# Patient Record
Sex: FEMALE | Race: ASIAN | NOT HISPANIC OR LATINO | Employment: OTHER | URBAN - NONMETROPOLITAN AREA
[De-identification: names, ages, dates, MRNs, and addresses within clinical notes are randomized per-mention and may not be internally consistent; named-entity substitution may affect disease eponyms.]

---

## 2024-06-25 ENCOUNTER — OFFICE VISIT (OUTPATIENT)
Dept: OBSTETRICS AND GYNECOLOGY | Age: 54
End: 2024-06-25
Payer: OTHER GOVERNMENT

## 2024-06-25 VITALS
SYSTOLIC BLOOD PRESSURE: 132 MMHG | DIASTOLIC BLOOD PRESSURE: 80 MMHG | BODY MASS INDEX: 25.59 KG/M2 | WEIGHT: 153.6 LBS | HEIGHT: 65 IN

## 2024-06-25 DIAGNOSIS — Z11.3 SCREENING EXAMINATION FOR VENEREAL DISEASE: ICD-10-CM

## 2024-06-25 DIAGNOSIS — N92.0 MENSTRUAL SPOTTING: ICD-10-CM

## 2024-06-25 DIAGNOSIS — Z12.11 SCREENING FOR MALIGNANT NEOPLASM OF COLON: ICD-10-CM

## 2024-06-25 DIAGNOSIS — R14.0 ABDOMINAL BLOATING: ICD-10-CM

## 2024-06-25 DIAGNOSIS — Z12.4 SCREENING FOR MALIGNANT NEOPLASM OF CERVIX: ICD-10-CM

## 2024-06-25 DIAGNOSIS — R93.5 ABNORMAL ULTRASOUND OF ENDOMETRIUM: ICD-10-CM

## 2024-06-25 DIAGNOSIS — N97.9 INFERTILITY, FEMALE: ICD-10-CM

## 2024-06-25 DIAGNOSIS — Z32.00 POSSIBLE PREGNANCY, NOT CONFIRMED: ICD-10-CM

## 2024-06-25 DIAGNOSIS — D25.1 INTRAMURAL LEIOMYOMA OF UTERUS: ICD-10-CM

## 2024-06-25 DIAGNOSIS — Z12.31 SCREENING MAMMOGRAM FOR BREAST CANCER: ICD-10-CM

## 2024-06-25 DIAGNOSIS — N95.1 PERIMENOPAUSAL: Primary | ICD-10-CM

## 2024-06-25 LAB
B-HCG UR QL: NEGATIVE
EXPIRATION DATE: NORMAL
INTERNAL NEGATIVE CONTROL: NEGATIVE
INTERNAL POSITIVE CONTROL: POSITIVE
Lab: NORMAL

## 2024-06-25 PROCEDURE — 99204 OFFICE O/P NEW MOD 45 MIN: CPT | Performed by: OBSTETRICS & GYNECOLOGY

## 2024-06-25 PROCEDURE — 81025 URINE PREGNANCY TEST: CPT | Performed by: OBSTETRICS & GYNECOLOGY

## 2024-06-25 RX ORDER — DIPHENHYDRAMINE HCL 25 MG
25 CAPSULE ORAL EVERY 6 HOURS PRN
COMMUNITY

## 2024-06-25 RX ORDER — PRENATAL VIT NO.126/IRON/FOLIC 28MG-0.8MG
TABLET ORAL DAILY
COMMUNITY

## 2024-06-25 NOTE — PROGRESS NOTES
Lucas Ferreira MD  [facility]  [address]  [place] [address]  Office [phone]  Fax [phone]      [facility]  Davina Ayala  : 1970  MRN: [id number]    Subjective   Subjective     Chief Complaint   Patient presents with    Menstrual Problem     Patient here to discuss concerns around possible pregnancy or perimenopause. Was late for period, then had spotting for 5 days. The last 4 months, she has had spotting for 5 days. Fatigue, nausea, some strange food cravings. No hot flashes or night sweats. Negative pregnancy tests.    LMP started 24  Denies history of abnormal paps.       History of Present Illness  Davina Ayala is a 53 y.o. female , , who comes to the office today for consultation for infertility. Interested in future fertility. Has two prior miscarriages in her late 40s. Does note for the past 4 months spotting monthly. Not similar to a menses. Prior to this was normal menses. Spotting 5 days. LMP , with 7 days of bleeding. Having bloating and morning sickness. Did have a positive pregnancy test 3 months ago. Worried she is pregnant. Denies menopausal symptoms otherwise. .      Review of Systems   Genitourinary:  Positive for menstrual problem. Negative for decreased urine volume, difficulty urinating, dyspareunia, dysuria, enuresis, flank pain, frequency, genital sores, hematuria, pelvic pain, urgency, vaginal bleeding, vaginal discharge and vaginal pain.   All other systems reviewed and are negative.           Personal History     The following portions of the patient's history were reviewed and updated as appropriate: allergies, current medications, past family history, past medical history, past social history, past surgical history, and problem list.    History Review Reviewed Comments   Past Medical History:  [x]     Past Surgical History: [x]     Family History: [x]     Social History: [x]       Current Outpatient Medications  "  Medication Instructions    diphenhydrAMINE (BENADRYL) 25 mg, Oral, Every 6 Hours PRN    prenatal vitamin (prenatal, CLASSIC, vitamin) tablet Oral, Daily       Allergies   Allergen Reactions    Risperidone Shortness Of Breath    Bactrim [Sulfamethoxazole-Trimethoprim] Rash       Objective    Objective     Vitals:   Visit Vitals  /80   Ht 165.1 cm (65\")   Wt 69.7 kg (153 lb 9.6 oz)   LMP 06/13/2024   BMI 25.56 kg/m²        Physical Exam  Vitals reviewed.   Constitutional:       General: She is not in acute distress.     Appearance: Normal appearance. She is not ill-appearing.   HENT:      Head: Normocephalic and atraumatic.      Nose: No congestion or rhinorrhea.   Eyes:      General: No scleral icterus.        Right eye: No discharge.         Left eye: No discharge.      Extraocular Movements: Extraocular movements intact.      Conjunctiva/sclera: Conjunctivae normal.   Pulmonary:      Effort: Pulmonary effort is normal. No accessory muscle usage or respiratory distress.   Chest:      Comments: Patient deferred today    Genitourinary:     Comments: Patient deferred today  Musculoskeletal:      Right lower leg: No edema.      Left lower leg: No edema.   Skin:     General: Skin is warm and dry.      Coloration: Skin is not ashen, cyanotic or jaundiced.   Neurological:      General: No focal deficit present.      Mental Status: She is alert and oriented to person, place, and time.   Psychiatric:         Mood and Affect: Mood normal.         Behavior: Behavior is cooperative.       Result Review    POC Pregnancy, Urine (06/25/2024 16:02) negative    US Non-ob Transvaginal (06/25/2024 15:58)   1.  Uterus: Enlarged, with uterine dimensions 8.8 x 5.7 x 5.3 cm, and Anteverted     2.  Endometrium:  thickened, 1.1 cm; isoechoic area within the endometrium possibly a polyp      3.  Myometrium: anterior leiomyoma measures 2.1 x 2.2 cm     4.  Ovaries  Left:    Normal/unremarkable   Right:  Normal/unremarkable     "     Assessment & Plan   Assessment / Plan     Diagnoses and all orders for this visit:    1. Perimenopausal (Primary)  -     Antimullerian Hormone (AMH)  -     Follicle Stimulating Hormone    2. Possible pregnancy, not confirmed  -     Cancel: HCG, B-subunit, Quantitative ([facility] Only)  -     POC Pregnancy, Urine    3. Infertility, female    4. Screening mammogram for breast cancer  -     Mammo screening digital tomosynthesis bilateral w CAD; Future    5. Screening for malignant neoplasm of colon  -     Ambulatory Referral For Screening Colonoscopy    6. Menstrual spotting    7. Screening examination for venereal disease    8. Screening for malignant neoplasm of cervix    9. Abnormal ultrasound of endometrium    10. Intramural leiomyoma of uterus    11. Abdominal bloating        Discussion:   Recommended updating routine annual healthcare including colonoscopy/mammogram/pap smear. Pregnancy test today is negative. Not pregnant. Advised checking FSH/AMH levels for options for fertility. Discussed if she desires fertility, she will need to see [facility] for possible IVF. Discussed risks of AMA and pregnancy at this age including the increases risks of morbidity/mortality. Discussed that with her spotting, this is abnormal and with her ultrasound findings, further evaluation is warranted. She is agreeable to this but wants to return another date. Return for pap smear/EMB. Questions answered. She expressed understanding.     Follow-up: Return in about 2 weeks (around 7/9/2024), or if symptoms worsen or fail to improve, for GYN f/u/emb/pap.    Lucas Ferreira MD

## 2024-06-28 LAB
FSH SERPL-ACNC: 4.6 MIU/ML
MIS SERPL-MCNC: 0.73 NG/ML

## 2024-07-08 LAB
NCCN CRITERIA FLAG: NORMAL
TYRER CUZICK SCORE: 10.1

## 2024-07-11 ENCOUNTER — OFFICE VISIT (OUTPATIENT)
Dept: OBSTETRICS AND GYNECOLOGY | Age: 54
End: 2024-07-11
Payer: OTHER GOVERNMENT

## 2024-07-11 ENCOUNTER — HOSPITAL ENCOUNTER (OUTPATIENT)
Dept: MAMMOGRAPHY | Facility: HOSPITAL | Age: 54
Discharge: HOME | End: 2024-07-11
Admitting: OBSTETRICS & GYNECOLOGY
Payer: OTHER GOVERNMENT

## 2024-07-11 VITALS
WEIGHT: 150.4 LBS | HEIGHT: 65 IN | SYSTOLIC BLOOD PRESSURE: 128 MMHG | BODY MASS INDEX: 25.06 KG/M2 | DIASTOLIC BLOOD PRESSURE: 98 MMHG

## 2024-07-11 DIAGNOSIS — N92.6 IRREGULAR MENSES: ICD-10-CM

## 2024-07-11 DIAGNOSIS — R93.5 ABNORMAL ULTRASOUND OF ENDOMETRIUM: Primary | ICD-10-CM

## 2024-07-11 DIAGNOSIS — Z12.31 SCREENING MAMMOGRAM FOR BREAST CANCER: ICD-10-CM

## 2024-07-11 PROCEDURE — G0123 SCREEN CERV/VAG THIN LAYER: HCPCS | Performed by: OBSTETRICS & GYNECOLOGY

## 2024-07-11 PROCEDURE — 77063 BREAST TOMOSYNTHESIS BI: CPT

## 2024-07-11 PROCEDURE — 87624 HPV HI-RISK TYP POOLED RSLT: CPT | Performed by: OBSTETRICS & GYNECOLOGY

## 2024-07-11 PROCEDURE — 77067 SCR MAMMO BI INCL CAD: CPT

## 2024-07-11 PROCEDURE — 88305 TISSUE EXAM BY PATHOLOGIST: CPT | Performed by: OBSTETRICS & GYNECOLOGY

## 2024-07-11 NOTE — PROGRESS NOTES
"    Lucas Ferreira MD  [facility]  [address]  [place] [address]  Office [phone]  Fax [phone]      Psychiatric  Davina Ayala  : 1970  MRN: [id number]    Subjective   Subjective     Chief Complaint   Patient presents with    Follow-up     Pt here for EMB and PAP smear.       History of Present Illness  Davina Ayala is a 54 y.o. female , , who comes to the office today for pap smear and EMB. Reports normal menses since last visit. No other complaints. Advised EMB still recommended given her ultrasound and irregular menses.  She is agreeable to plan of care.    Objective    Objective     Vitals:   Visit Vitals  /98   Ht 165.1 cm (65\")   Wt 68.2 kg (150 lb 6.4 oz)   LMP 2024 (Exact Date)   BMI 25.03 kg/m²        Physical Exam  Vitals reviewed. Exam conducted with a chaperone present.   Constitutional:       General: She is not in acute distress.     Appearance: Normal appearance. She is not ill-appearing.   HENT:      Head: Normocephalic and atraumatic.      Nose: No congestion or rhinorrhea.   Eyes:      General: No scleral icterus.        Right eye: No discharge.         Left eye: No discharge.      Extraocular Movements: Extraocular movements intact.      Conjunctiva/sclera: Conjunctivae normal.   Pulmonary:      Effort: Pulmonary effort is normal. No accessory muscle usage or respiratory distress.   Abdominal:      Hernia: There is no hernia in the left inguinal area or right inguinal area.   Genitourinary:     General: Normal vulva.      Exam position: Lithotomy position.      Pubic Area: No rash or pubic lice.       Labia:         Right: No rash, tenderness, lesion or injury.         Left: No rash, tenderness, lesion or injury.       Urethra: No prolapse, urethral pain, urethral swelling or urethral lesion.      Vagina: Normal.      Cervix: Normal.   Musculoskeletal:      Right lower leg: No edema.      Left lower leg: No edema.   Lymphadenopathy:    "   Lower Body: No right inguinal adenopathy. No left inguinal adenopathy.   Skin:     General: Skin is warm and dry.      Coloration: Skin is not ashen, cyanotic or jaundiced.   Neurological:      General: No focal deficit present.      Mental Status: She is alert and oriented to person, place, and time.   Psychiatric:         Mood and Affect: Mood normal.         Behavior: Behavior is cooperative.         Procedure   The following procedures were performed in the clinic today:  Endometrial Biopsy    Date/Time: 7/11/2024 12:23 PM    Performed by: Lucas Ferreira MD  Authorized by: Lucas Ferreira MD    Consent:     Consent obtained: verbal    Consent given by: patient    Risks discussed: bleeding, infection, need for repeat procedure and pain    Alternatives discussed: alternative treatment    Patient agrees, verbalizes understanding, and wants to proceed: yes    Universal protocol:     Immediately prior to procedure a time out was called: yes      Patient identity confirmed: verbally with patient  Pre-procedure:     Urine pregnancy test: negative    Procedure:     Prepped with: Betadine    Tenaculum used: no      A local block was performed: no      Cervix dilated: no      Number of passes: 3  Findings:     Cervix: normal      Uterus depth by sound (cm): 6    Specimen collected: specimen collected and sent to pathology      Patient tolerance: tolerated well, no immediate complications           Result Review    POC Pregnancy, Urine (07/11/2024 11:51)   Negative    US Non-ob Transvaginal (06/25/2024 15:58)   1.  Uterus: Enlarged, with uterine dimensions 8.8 x 5.7 x 5.3 cm, and Anteverted     2.  Endometrium:  thickened, 1.1 cm; isoechoic area within the endometrium possibly a polyp      3.  Myometrium: anterior leiomyoma measures 2.1 x 2.2 cm     4.  Ovaries  Left:    Normal/unremarkable   Right:  Normal/unremarkable         Assessment & Plan   Assessment / Plan     Diagnoses and all orders for this visit:    1.  Abnormal ultrasound of endometrium (Primary)  -     POC Pregnancy, Urine  -     Tissue Pathology Exam  -     Liquid-based Pap Smear, Screening    Other orders  -     Endometrial Biopsy        Discussion: Postprocedure care discussed. Call with results.     Follow-up: Return if symptoms worsen or fail to improve.    Lucas Ferreira MD

## 2024-07-15 ENCOUNTER — DOCUMENTATION (OUTPATIENT)
Dept: OBSTETRICS AND GYNECOLOGY | Age: 54
End: 2024-07-15
Payer: OTHER GOVERNMENT

## 2024-07-15 LAB
CYTO UR: NORMAL
GEN CATEG CVX/VAG CYTO-IMP: NORMAL
HPV I/H RISK 4 DNA CVX QL PROBE+SIG AMP: NOT DETECTED
LAB AP CASE REPORT: NORMAL
LAB AP CASE REPORT: NORMAL
LAB AP GYN ADDITIONAL INFORMATION: NORMAL
LAB AP GYN OTHER FINDINGS: NORMAL
Lab: NORMAL
Lab: NORMAL
PATH INTERP SPEC-IMP: NORMAL
PATH REPORT.FINAL DX SPEC: NORMAL
PATH REPORT.GROSS SPEC: NORMAL
STAT OF ADQ CVX/VAG CYTO-IMP: NORMAL

## 2024-07-15 RX ORDER — METRONIDAZOLE 500 MG/1
500 TABLET ORAL 2 TIMES DAILY
Qty: 14 TABLET | Refills: 0 | Status: SHIPPED | OUTPATIENT
Start: 2024-07-15 | End: 2024-07-22

## 2024-08-13 ENCOUNTER — OFFICE VISIT (OUTPATIENT)
Dept: GASTROENTEROLOGY | Facility: CLINIC | Age: 54
End: 2024-08-13
Payer: OTHER GOVERNMENT

## 2024-08-13 VITALS
TEMPERATURE: 97.8 F | DIASTOLIC BLOOD PRESSURE: 108 MMHG | WEIGHT: 158 LBS | SYSTOLIC BLOOD PRESSURE: 184 MMHG | HEIGHT: 65 IN | BODY MASS INDEX: 26.33 KG/M2 | HEART RATE: 57 BPM | OXYGEN SATURATION: 99 %

## 2024-08-13 DIAGNOSIS — Z12.11 ENCOUNTER FOR SCREENING FOR MALIGNANT NEOPLASM OF COLON: Primary | ICD-10-CM

## 2024-08-13 NOTE — PROGRESS NOTES
[facility]    Primary Physician Elizabeth Rodriguez MD    8/13/2024    Davina Ayala   1970      Chief Complaint   Patient presents with    Colonoscopy       Subjective     HPI    Davina Ayala is a 54 y.o. female who presents as a referral for preventative maintenance. She has no complaints of nausea or vomiting. No change in bowels. No wt loss. No BRBPR. No melena. No abdominal pain.       She is adopted.           Past Medical History:   Diagnosis Date    Genital herpes simplex     Hyperlipidemia     Vitamin D deficiency        Past Surgical History:   Procedure Laterality Date    MANDIBLE FRACTURE SURGERY Bilateral     injury when she was younger       Outpatient Medications Marked as Taking for the 8/13/24 encounter (Office Visit) with Baylee Calixto APRN   Medication Sig Dispense Refill    Cholecalciferol 25 MCG (1000 UT) tablet Take 1 tablet by mouth Daily.      diphenhydrAMINE (BENADRYL) 25 mg capsule Take 1 capsule by mouth Every 6 (Six) Hours As Needed for Itching.      multivitamin with minerals tablet tablet Take 1 tablet by mouth Daily.      prenatal vitamin (prenatal, CLASSIC, vitamin) tablet Take  by mouth Daily.         Allergies   Allergen Reactions    Risperidone Shortness Of Breath    Bactrim [Sulfamethoxazole-Trimethoprim] Rash       Social History     Socioeconomic History    Marital status:    Tobacco Use    Smoking status: Never    Smokeless tobacco: Never   Vaping Use    Vaping status: Never Used   Substance and Sexual Activity    Alcohol use: Never    Drug use: Never    Sexual activity: Yes     Partners: Male       Family History   Adopted: Yes   Family history unknown: Yes       Review of Systems   Constitutional:  Negative for chills, fever and unexpected weight change.   Respiratory:  Negative for shortness of breath.    Cardiovascular:  Negative for chest pain.   Gastrointestinal:  Negative for abdominal distention, abdominal pain,  anal bleeding, blood in stool, constipation, diarrhea, nausea and vomiting.       Objective     Vitals:    08/13/24 1001   BP: (!) 184/108   Pulse: 57   Temp: 97.8 °F (36.6 °C)   SpO2: 99%         08/13/24  1001   Weight: 71.7 kg (158 lb)   Repeat b/p 150/98.         Body mass index is 26.29 kg/m².    Physical Exam  Vitals reviewed.   Constitutional:       General: She is not in acute distress.  Cardiovascular:      Rate and Rhythm: Normal rate and regular rhythm.      Heart sounds: Normal heart sounds.   Pulmonary:      Effort: Pulmonary effort is normal.      Breath sounds: Normal breath sounds.   Abdominal:      General: Bowel sounds are normal. There is no distension.      Palpations: Abdomen is soft.      Tenderness: There is no abdominal tenderness.   Skin:     General: Skin is warm and dry.   Neurological:      Mental Status: She is alert.         Imaging Results (Most Recent)       None            Assessment & Plan     Diagnoses and all orders for this visit:    1. Encounter for screening for malignant neoplasm of colon (Primary)  -     Case Request; Standing  -     Case Request    Other orders  -     Implement Anesthesia Orders Day of Procedure; Standing  -     Obtain Informed Consent; Standing      Schedule colonoscopy. Miralax prep.          I recommend that she monitor blood pressure and if continues to run high then she needs to contact her pcp.       COLONOSCOPY WITH ANESTHESIA (N/A)  All risks, benefits, alternatives, and indications of colonoscopy procedure have been discussed with the patient. Risks to include perforation of the colon requiring possible surgery or colostomy, risk of bleeding from biopsies or removal of colon tissue, possibility of missing a colon polyp or cancer, or adverse drug reaction.  Benefits to include the diagnosis and management of disease of the colon and rectum. Alternatives to include barium enema, radiographic evaluation, lab testing or no intervention. Pt verbalizes  understanding and agrees.     There are no Patient Instructions on file for this visit.    Baylee Calixto, APRN

## 2024-08-13 NOTE — H&P (VIEW-ONLY)
[facility]    Primary Physician Elizabeth Rodriguez MD    8/13/2024    Davina Ayala   1970      Chief Complaint   Patient presents with    Colonoscopy       Subjective     HPI    Davina Ayala is a 54 y.o. female who presents as a referral for preventative maintenance. She has no complaints of nausea or vomiting. No change in bowels. No wt loss. No BRBPR. No melena. No abdominal pain.       She is adopted.           Past Medical History:   Diagnosis Date    Genital herpes simplex     Hyperlipidemia     Vitamin D deficiency        Past Surgical History:   Procedure Laterality Date    MANDIBLE FRACTURE SURGERY Bilateral     injury when she was younger       Outpatient Medications Marked as Taking for the 8/13/24 encounter (Office Visit) with Baylee Calixto APRN   Medication Sig Dispense Refill    Cholecalciferol 25 MCG (1000 UT) tablet Take 1 tablet by mouth Daily.      diphenhydrAMINE (BENADRYL) 25 mg capsule Take 1 capsule by mouth Every 6 (Six) Hours As Needed for Itching.      multivitamin with minerals tablet tablet Take 1 tablet by mouth Daily.      prenatal vitamin (prenatal, CLASSIC, vitamin) tablet Take  by mouth Daily.         Allergies   Allergen Reactions    Risperidone Shortness Of Breath    Bactrim [Sulfamethoxazole-Trimethoprim] Rash       Social History     Socioeconomic History    Marital status:    Tobacco Use    Smoking status: Never    Smokeless tobacco: Never   Vaping Use    Vaping status: Never Used   Substance and Sexual Activity    Alcohol use: Never    Drug use: Never    Sexual activity: Yes     Partners: Male       Family History   Adopted: Yes   Family history unknown: Yes       Review of Systems   Constitutional:  Negative for chills, fever and unexpected weight change.   Respiratory:  Negative for shortness of breath.    Cardiovascular:  Negative for chest pain.   Gastrointestinal:  Negative for abdominal distention, abdominal pain,  anal bleeding, blood in stool, constipation, diarrhea, nausea and vomiting.       Objective     Vitals:    08/13/24 1001   BP: (!) 184/108   Pulse: 57   Temp: 97.8 °F (36.6 °C)   SpO2: 99%         08/13/24  1001   Weight: 71.7 kg (158 lb)   Repeat b/p 150/98.         Body mass index is 26.29 kg/m².    Physical Exam  Vitals reviewed.   Constitutional:       General: She is not in acute distress.  Cardiovascular:      Rate and Rhythm: Normal rate and regular rhythm.      Heart sounds: Normal heart sounds.   Pulmonary:      Effort: Pulmonary effort is normal.      Breath sounds: Normal breath sounds.   Abdominal:      General: Bowel sounds are normal. There is no distension.      Palpations: Abdomen is soft.      Tenderness: There is no abdominal tenderness.   Skin:     General: Skin is warm and dry.   Neurological:      Mental Status: She is alert.         Imaging Results (Most Recent)       None            Assessment & Plan     Diagnoses and all orders for this visit:    1. Encounter for screening for malignant neoplasm of colon (Primary)  -     Case Request; Standing  -     Case Request    Other orders  -     Implement Anesthesia Orders Day of Procedure; Standing  -     Obtain Informed Consent; Standing      Schedule colonoscopy. Miralax prep.          I recommend that she monitor blood pressure and if continues to run high then she needs to contact her pcp.       COLONOSCOPY WITH ANESTHESIA (N/A)  All risks, benefits, alternatives, and indications of colonoscopy procedure have been discussed with the patient. Risks to include perforation of the colon requiring possible surgery or colostomy, risk of bleeding from biopsies or removal of colon tissue, possibility of missing a colon polyp or cancer, or adverse drug reaction.  Benefits to include the diagnosis and management of disease of the colon and rectum. Alternatives to include barium enema, radiographic evaluation, lab testing or no intervention. Pt verbalizes  understanding and agrees.     There are no Patient Instructions on file for this visit.    Baylee Calixto, APRN

## 2024-08-14 ENCOUNTER — OFFICE VISIT (OUTPATIENT)
Dept: OBSTETRICS AND GYNECOLOGY | Age: 54
End: 2024-08-14
Payer: OTHER GOVERNMENT

## 2024-08-14 VITALS
HEIGHT: 65 IN | BODY MASS INDEX: 26.33 KG/M2 | WEIGHT: 158 LBS | SYSTOLIC BLOOD PRESSURE: 152 MMHG | DIASTOLIC BLOOD PRESSURE: 98 MMHG

## 2024-08-14 DIAGNOSIS — N89.8 VAGINAL DISCHARGE: ICD-10-CM

## 2024-08-14 DIAGNOSIS — R93.5 ABNORMAL ULTRASOUND OF ENDOMETRIUM: Primary | ICD-10-CM

## 2024-08-14 PROCEDURE — 88305 TISSUE EXAM BY PATHOLOGIST: CPT | Performed by: OBSTETRICS & GYNECOLOGY

## 2024-08-14 NOTE — PROGRESS NOTES
"    Lucas Ferreira MD  [facility]  [address]  [place] [address]  Office [phone]  Fax [phone]      [facility]  Davina Ayala  : 1970  MRN: [id number]    Subjective   Subjective     Chief Complaint   Patient presents with    Follow-up     Pt here to repeat EMB. Pt had EMB on  but the results came back that there was not enough tissue.        History of Present Illness  Davina Ayala is a 54 y.o. female , , who comes to the office today for repeat EMB. No complaints. Last EMB without endometrium.      Objective    Objective     Vitals:   Visit Vitals  /98   Ht 165.1 cm (65\")   Wt 71.7 kg (158 lb)   LMP 2024 (Approximate)   BMI 26.29 kg/m²        Physical Exam  Vitals reviewed. Exam conducted with a chaperone present.   Constitutional:       General: She is not in acute distress.     Appearance: Normal appearance. She is not ill-appearing.   HENT:      Head: Normocephalic and atraumatic.      Nose: No congestion or rhinorrhea.   Eyes:      General: No scleral icterus.        Right eye: No discharge.         Left eye: No discharge.      Extraocular Movements: Extraocular movements intact.      Conjunctiva/sclera: Conjunctivae normal.   Pulmonary:      Effort: Pulmonary effort is normal. No accessory muscle usage or respiratory distress.   Abdominal:      Hernia: There is no hernia in the left inguinal area or right inguinal area.   Genitourinary:     General: Normal vulva.      Pubic Area: No rash or pubic lice.       Labia:         Right: No rash, tenderness, lesion or injury.         Left: No rash, tenderness, lesion or injury.       Urethra: No prolapse, urethral pain, urethral swelling or urethral lesion.      Vagina: Normal. Vaginal discharge (thick white cream discharge noted - patient deneis creams inside the vagina past 24 hours) present.      Cervix: Normal.   Musculoskeletal:      Right lower leg: No edema.      Left lower leg: No edema. "   Lymphadenopathy:      Lower Body: No right inguinal adenopathy. No left inguinal adenopathy.   Skin:     General: Skin is warm and dry.      Coloration: Skin is not ashen, cyanotic or jaundiced.   Neurological:      General: No focal deficit present.      Mental Status: She is alert and oriented to person, place, and time.   Psychiatric:         Mood and Affect: Mood normal.         Behavior: Behavior is cooperative.         Procedure   The following procedures were performed in the clinic today:  Endometrial Biopsy    Date/Time: 8/14/2024 10:20 AM    Performed by: Lucas Ferreira MD  Authorized by: Lucas Ferreira MD    Consent:     Consent obtained: verbal    Consent given by: patient    Risks discussed: bleeding, infection, need for repeat procedure and pain    Alternatives discussed: alternative treatment    Patient agrees, verbalizes understanding, and wants to proceed: yes    Universal protocol:     Immediately prior to procedure a time out was called: yes      Patient identity confirmed: verbally with patient  Pre-procedure:     Urine pregnancy test: negative    Procedure:     Prepped with: Betadine    Tenaculum used: yes      A local block was performed: no      Cervix dilated: no      Number of passes: 2  Findings:     Cervix: normal      Uterus depth by sound (cm): 11    Specimen collected: specimen collected and sent to pathology      Patient tolerance: tolerated well, no immediate complications           Result Review    POC Pregnancy, Urine (08/14/2024 10:07) negative        Assessment & Plan   Assessment / Plan     Diagnoses and all orders for this visit:    1. Abnormal ultrasound of endometrium (Primary)  -     POC Pregnancy, Urine  -     Tissue Pathology Exam    2. Vaginal discharge  -     NuSwab Vaginitis (VG) - Swab, Vagina    Other orders  -     Endometrial Biopsy        Discussion: Postprocedure care discussed. Call with results.     Follow-up: Return if symptoms worsen or fail to  improve.    Lucas Ferreira MD

## 2024-08-15 LAB
CYTO UR: NORMAL
LAB AP CASE REPORT: NORMAL
Lab: NORMAL
PATH REPORT.FINAL DX SPEC: NORMAL
PATH REPORT.GROSS SPEC: NORMAL

## 2024-08-16 LAB
A VAGINAE DNA VAG QL NAA+PROBE: NORMAL SCORE
BVAB2 DNA VAG QL NAA+PROBE: NORMAL SCORE
C ALBICANS DNA VAG QL NAA+PROBE: NEGATIVE
C GLABRATA DNA VAG QL NAA+PROBE: NEGATIVE
MEGA1 DNA VAG QL NAA+PROBE: NORMAL SCORE
T VAGINALIS DNA VAG QL NAA+PROBE: NEGATIVE

## 2024-09-03 ENCOUNTER — ANESTHESIA (OUTPATIENT)
Dept: GASTROENTEROLOGY | Facility: HOSPITAL | Age: 54
End: 2024-09-03
Payer: OTHER GOVERNMENT

## 2024-09-03 ENCOUNTER — HOSPITAL ENCOUNTER (OUTPATIENT)
Facility: HOSPITAL | Age: 54
Setting detail: HOSPITAL OUTPATIENT SURGERY
Discharge: HOME | End: 2024-09-03
Attending: INTERNAL MEDICINE | Admitting: INTERNAL MEDICINE
Payer: OTHER GOVERNMENT

## 2024-09-03 ENCOUNTER — ANESTHESIA EVENT (OUTPATIENT)
Dept: GASTROENTEROLOGY | Facility: HOSPITAL | Age: 54
End: 2024-09-03
Payer: OTHER GOVERNMENT

## 2024-09-03 VITALS
SYSTOLIC BLOOD PRESSURE: 125 MMHG | HEIGHT: 65 IN | WEIGHT: 152 LBS | OXYGEN SATURATION: 98 % | BODY MASS INDEX: 25.33 KG/M2 | RESPIRATION RATE: 17 BRPM | HEART RATE: 69 BPM | TEMPERATURE: 96.6 F | DIASTOLIC BLOOD PRESSURE: 66 MMHG

## 2024-09-03 DIAGNOSIS — Z12.11 ENCOUNTER FOR SCREENING FOR MALIGNANT NEOPLASM OF COLON: ICD-10-CM

## 2024-09-03 LAB — B-HCG UR QL: NEGATIVE

## 2024-09-03 PROCEDURE — 45385 COLONOSCOPY W/LESION REMOVAL: CPT | Performed by: INTERNAL MEDICINE

## 2024-09-03 PROCEDURE — 25810000003 SODIUM CHLORIDE 0.9 % SOLUTION: Performed by: ANESTHESIOLOGY

## 2024-09-03 PROCEDURE — 88305 TISSUE EXAM BY PATHOLOGIST: CPT | Performed by: INTERNAL MEDICINE

## 2024-09-03 PROCEDURE — 25010000002 PROPOFOL 10 MG/ML EMULSION: Performed by: NURSE ANESTHETIST, CERTIFIED REGISTERED

## 2024-09-03 PROCEDURE — 81025 URINE PREGNANCY TEST: CPT | Performed by: ANESTHESIOLOGY

## 2024-09-03 RX ORDER — SODIUM CHLORIDE 0.9 % (FLUSH) 0.9 %
10 SYRINGE (ML) INJECTION AS NEEDED
Status: DISCONTINUED | OUTPATIENT
Start: 2024-09-03 | End: 2024-09-03 | Stop reason: HOSPADM

## 2024-09-03 RX ORDER — SODIUM CHLORIDE 9 MG/ML
1000 INJECTION, SOLUTION INTRAVENOUS CONTINUOUS
Status: DISCONTINUED | OUTPATIENT
Start: 2024-09-03 | End: 2024-09-03 | Stop reason: HOSPADM

## 2024-09-03 RX ORDER — PROPOFOL 10 MG/ML
VIAL (ML) INTRAVENOUS AS NEEDED
Status: DISCONTINUED | OUTPATIENT
Start: 2024-09-03 | End: 2024-09-03 | Stop reason: SURG

## 2024-09-03 RX ORDER — SODIUM CHLORIDE 9 MG/ML
500 INJECTION, SOLUTION INTRAVENOUS CONTINUOUS PRN
Status: DISCONTINUED | OUTPATIENT
Start: 2024-09-03 | End: 2024-09-03 | Stop reason: HOSPADM

## 2024-09-03 RX ORDER — LIDOCAINE HYDROCHLORIDE 10 MG/ML
0.5 INJECTION, SOLUTION EPIDURAL; INFILTRATION; INTRACAUDAL; PERINEURAL ONCE AS NEEDED
Status: DISCONTINUED | OUTPATIENT
Start: 2024-09-03 | End: 2024-09-03 | Stop reason: HOSPADM

## 2024-09-03 RX ORDER — ONDANSETRON 2 MG/ML
4 INJECTION INTRAMUSCULAR; INTRAVENOUS ONCE AS NEEDED
Status: DISCONTINUED | OUTPATIENT
Start: 2024-09-03 | End: 2024-09-03 | Stop reason: HOSPADM

## 2024-09-03 RX ORDER — LIDOCAINE HYDROCHLORIDE 20 MG/ML
INJECTION, SOLUTION EPIDURAL; INFILTRATION; INTRACAUDAL; PERINEURAL AS NEEDED
Status: DISCONTINUED | OUTPATIENT
Start: 2024-09-03 | End: 2024-09-03 | Stop reason: SURG

## 2024-09-03 RX ADMIN — PROPOFOL 400 MG: 10 INJECTION, EMULSION INTRAVENOUS at 13:03

## 2024-09-03 RX ADMIN — LIDOCAINE HYDROCHLORIDE 100 MG: 20 INJECTION, SOLUTION EPIDURAL; INFILTRATION; INTRACAUDAL; PERINEURAL at 13:03

## 2024-09-03 RX ADMIN — SODIUM CHLORIDE 500 ML: 9 INJECTION, SOLUTION INTRAVENOUS at 11:04

## 2024-09-03 NOTE — ANESTHESIA PREPROCEDURE EVALUATION
Anesthesia Evaluation     Patient summary reviewed   no history of anesthetic complications:   NPO Solid Status: > 8 hours  NPO Liquid Status: < 2 hours           Airway   Mallampati: II  TM distance: >3 FB  No difficulty expected  Dental          Pulmonary - negative pulmonary ROS   Cardiovascular   Exercise tolerance: good (4-7 METS)    (+) hypertension (no meds), hyperlipidemia      Neuro/Psych- negative ROS  GI/Hepatic/Renal/Endo - negative ROS     Musculoskeletal     Abdominal    Substance History      OB/GYN          Other                    Anesthesia Plan    ASA 2     MAC     (OK to go after 12)    Anesthetic plan, risks, benefits, and alternatives have been provided, discussed and informed consent has been obtained with: patient.    CODE STATUS:

## 2024-09-03 NOTE — ANESTHESIA POSTPROCEDURE EVALUATION
Patient: Davina Ayala    Procedure Summary       Date: 09/03/24 Room / Location: [facility] ENDOSCOPY 2 / [facility] ENDOSCOPY    Anesthesia Start: 1258 Anesthesia Stop: 1328    Procedure: COLONOSCOPY WITH ANESTHESIA Diagnosis:       Encounter for screening for malignant neoplasm of colon      (Encounter for screening for malignant neoplasm of colon [Z12.11])    Surgeons: Lanre Alas MD Provider: SHAJI Cantu CRNA    Anesthesia Type: MAC ASA Status: 2            Anesthesia Type: MAC    Vitals  No vitals data found for the desired time range.          Post Anesthesia Care and Evaluation    Patient location during evaluation: PACU  Patient participation: complete - patient participated  Level of consciousness: awake and alert  Pain score: 0  Pain management: adequate    Airway patency: patent  Anesthetic complications: No anesthetic complications    Cardiovascular status: acceptable and stable  Respiratory status: acceptable and unassisted  Hydration status: acceptable

## 2024-09-05 LAB
CYTO UR: NORMAL
LAB AP CASE REPORT: NORMAL
Lab: NORMAL
PATH REPORT.FINAL DX SPEC: NORMAL
PATH REPORT.GROSS SPEC: NORMAL

## (undated) DEVICE — THE SINGLE USE ETRAP – POLYP TRAP IS USED FOR SUCTION RETRIEVAL OF ENDOSCOPICALLY REMOVED POLYPS.: Brand: ETRAP

## (undated) DEVICE — MASK,OXYGEN,MED CONC,ADLT,7' TUB, UC: Brand: PENDING

## (undated) DEVICE — CUFF,BP,DISP,1 TUBE,ADULT,HP: Brand: MEDLINE

## (undated) DEVICE — Device: Brand: DEFENDO AIR/WATER/SUCTION AND BIOPSY VALVE

## (undated) DEVICE — THE CHANNEL CLEANING BRUSH IS A NYLON FLEXI BRUSH ATTACHED TO A FLEXIBLE PLASTIC SHEATH DESIGNED TO SAFELY REMOVE DEBRIS FROM FLEXIBLE ENDOSCOPES.

## (undated) DEVICE — YANKAUER,BULB TIP WITH VENT: Brand: ARGYLE

## (undated) DEVICE — Device: Brand: SINGLE USE ELECTROSURGICAL SNARE SD-400

## (undated) DEVICE — SENSR O2 OXIMAX FNGR A/ 18IN NONSTR